# Patient Record
Sex: FEMALE | Race: WHITE | NOT HISPANIC OR LATINO | Employment: OTHER | URBAN - METROPOLITAN AREA
[De-identification: names, ages, dates, MRNs, and addresses within clinical notes are randomized per-mention and may not be internally consistent; named-entity substitution may affect disease eponyms.]

---

## 2017-06-22 ENCOUNTER — PATIENT OUTREACH (OUTPATIENT)
Dept: ADMINISTRATIVE | Facility: HOSPITAL | Age: 72
End: 2017-06-22

## 2017-06-22 NOTE — PROGRESS NOTES
"CALLED PATIENT TO SCHEDULE APPT. HASN'T BEEN IN SINCE 6/30/2015. PATIENT STATED "I SEE A DIFFERENT PCP NOW". UPDATED CARE TEAM.  "

## 2018-07-23 ENCOUNTER — OFFICE VISIT (OUTPATIENT)
Dept: OPHTHALMOLOGY | Facility: CLINIC | Age: 73
End: 2018-07-23
Payer: MEDICARE

## 2018-07-23 DIAGNOSIS — H52.03 HYPEROPIA OF BOTH EYES WITH ASTIGMATISM AND PRESBYOPIA: ICD-10-CM

## 2018-07-23 DIAGNOSIS — H25.013 CORTICAL AGE-RELATED CATARACT OF BOTH EYES: ICD-10-CM

## 2018-07-23 DIAGNOSIS — H52.4 HYPEROPIA OF BOTH EYES WITH ASTIGMATISM AND PRESBYOPIA: ICD-10-CM

## 2018-07-23 DIAGNOSIS — H52.203 HYPEROPIA OF BOTH EYES WITH ASTIGMATISM AND PRESBYOPIA: ICD-10-CM

## 2018-07-23 DIAGNOSIS — H25.13 NUCLEAR SCLEROSIS, BILATERAL: Primary | ICD-10-CM

## 2018-07-23 PROCEDURE — 92004 COMPRE OPH EXAM NEW PT 1/>: CPT | Mod: S$PBB,,, | Performed by: OPTOMETRIST

## 2018-07-23 PROCEDURE — 92015 DETERMINE REFRACTIVE STATE: CPT | Mod: ,,, | Performed by: OPTOMETRIST

## 2018-07-23 PROCEDURE — 99211 OFF/OP EST MAY X REQ PHY/QHP: CPT | Mod: PBBFAC,PO | Performed by: OPTOMETRIST

## 2018-07-23 PROCEDURE — 99999 PR PBB SHADOW E&M-EST. PATIENT-LVL I: CPT | Mod: PBBFAC,,, | Performed by: OPTOMETRIST

## 2018-07-23 RX ORDER — DIPYRIDAMOLE 50 MG
50 TABLET ORAL
COMMUNITY
Start: 2018-07-06 | End: 2018-07-23 | Stop reason: SDUPTHER

## 2018-07-23 RX ORDER — AMIODARONE HYDROCHLORIDE 200 MG/1
TABLET ORAL
COMMUNITY
Start: 2018-07-10 | End: 2018-07-23 | Stop reason: SDUPTHER

## 2018-07-23 NOTE — PROGRESS NOTES
HPI     PTs last exam was 11/5/14 with DNL. PT c/o overall blurred va and wears   gls full time.   HPI    Any vision changes since last exam: decreased overall va  Eye pain: no  Other ocular symptoms: dryness, uses AT with some relief    Do you wear currently wear glasses or contacts? gls    Interested in contacts today? no    Do you plan on getting new glasses today? If needed      Last edited by Ely Mcmahon MA on 7/23/2018  2:32 PM. (History)            Assessment /Plan     For exam results, see Encounter Report.    Nuclear sclerosis, bilateral  Cortical age-related cataract of both eyes  Surgery is not indicated at this time. Monitor 12 months.    Hyperopia of both eyes with astigmatism and presbyopia  Eyeglass Final Rx     Eyeglass Final Rx       Sphere Cylinder Axis Add    Right +1.00 +0.50 150 +3.00    Left +1.50 +0.50 180 +3.00    Type:  PAL    Expiration Date:  7/24/2019          Eyeglass Final Rx #2       Sphere Cylinder Axis Add    Right +4.00 +0.50 150     Left +4.50 +0.50 180     Type:  SVL    Expiration Date:  7/24/2019    Comments:  Reading only                Counseled on shorter working distance  RTC 1 yr for dilated eye exam or PRN if any problems.   Discussed above and answered questions.

## 2021-03-11 ENCOUNTER — OFFICE VISIT (OUTPATIENT)
Dept: OPHTHALMOLOGY | Facility: CLINIC | Age: 76
End: 2021-03-11
Payer: MEDICARE

## 2021-03-11 DIAGNOSIS — H52.03 HYPEROPIA WITH ASTIGMATISM AND PRESBYOPIA, BILATERAL: ICD-10-CM

## 2021-03-11 DIAGNOSIS — H52.4 HYPEROPIA WITH ASTIGMATISM AND PRESBYOPIA, BILATERAL: ICD-10-CM

## 2021-03-11 DIAGNOSIS — H25.013 CORTICAL AGE-RELATED CATARACT OF BOTH EYES: ICD-10-CM

## 2021-03-11 DIAGNOSIS — H52.203 HYPEROPIA WITH ASTIGMATISM AND PRESBYOPIA, BILATERAL: ICD-10-CM

## 2021-03-11 DIAGNOSIS — H25.13 NUCLEAR SCLEROSIS, BILATERAL: Primary | ICD-10-CM

## 2021-03-11 PROCEDURE — 92014 PR EYE EXAM, EST PATIENT,COMPREHESV: ICD-10-PCS | Mod: S$PBB,,, | Performed by: OPTOMETRIST

## 2021-03-11 PROCEDURE — 99213 OFFICE O/P EST LOW 20 MIN: CPT | Mod: PBBFAC | Performed by: OPTOMETRIST

## 2021-03-11 PROCEDURE — 92014 COMPRE OPH EXAM EST PT 1/>: CPT | Mod: S$PBB,,, | Performed by: OPTOMETRIST

## 2021-03-11 PROCEDURE — 99999 PR PBB SHADOW E&M-EST. PATIENT-LVL III: CPT | Mod: PBBFAC,,, | Performed by: OPTOMETRIST

## 2021-03-11 PROCEDURE — 92015 DETERMINE REFRACTIVE STATE: CPT | Mod: ,,, | Performed by: OPTOMETRIST

## 2021-03-11 PROCEDURE — 92015 PR REFRACTION: ICD-10-PCS | Mod: ,,, | Performed by: OPTOMETRIST

## 2021-03-11 PROCEDURE — 99999 PR PBB SHADOW E&M-EST. PATIENT-LVL III: ICD-10-PCS | Mod: PBBFAC,,, | Performed by: OPTOMETRIST

## 2021-10-26 ENCOUNTER — OFFICE VISIT (OUTPATIENT)
Dept: OPHTHALMOLOGY | Facility: CLINIC | Age: 76
End: 2021-10-26
Payer: MEDICARE

## 2021-10-26 DIAGNOSIS — H01.02A SQUAMOUS BLEPHARITIS OF UPPER AND LOWER EYELIDS OF BOTH EYES: ICD-10-CM

## 2021-10-26 DIAGNOSIS — H02.20B: Primary | ICD-10-CM

## 2021-10-26 DIAGNOSIS — H25.13 NUCLEAR SCLEROSIS, BILATERAL: ICD-10-CM

## 2021-10-26 DIAGNOSIS — H16.212 EXPOSURE KERATOCONJUNCTIVITIS OF EYE, LEFT: ICD-10-CM

## 2021-10-26 DIAGNOSIS — H25.013 CORTICAL AGE-RELATED CATARACT OF BOTH EYES: ICD-10-CM

## 2021-10-26 DIAGNOSIS — H01.02B SQUAMOUS BLEPHARITIS OF UPPER AND LOWER EYELIDS OF BOTH EYES: ICD-10-CM

## 2021-10-26 PROCEDURE — 92012 INTRM OPH EXAM EST PATIENT: CPT | Mod: S$PBB,,, | Performed by: OPTOMETRIST

## 2021-10-26 PROCEDURE — 99213 OFFICE O/P EST LOW 20 MIN: CPT | Mod: PBBFAC | Performed by: OPTOMETRIST

## 2021-10-26 PROCEDURE — 99999 PR PBB SHADOW E&M-EST. PATIENT-LVL III: ICD-10-PCS | Mod: PBBFAC,,, | Performed by: OPTOMETRIST

## 2021-10-26 PROCEDURE — 92012 PR EYE EXAM, EST PATIENT,INTERMED: ICD-10-PCS | Mod: S$PBB,,, | Performed by: OPTOMETRIST

## 2021-10-26 PROCEDURE — 99999 PR PBB SHADOW E&M-EST. PATIENT-LVL III: CPT | Mod: PBBFAC,,, | Performed by: OPTOMETRIST

## 2021-10-26 RX ORDER — TOBRAMYCIN AND DEXAMETHASONE 3; 1 MG/ML; MG/ML
1 SUSPENSION/ DROPS OPHTHALMIC 4 TIMES DAILY
Qty: 2.5 ML | Refills: 1 | Status: SHIPPED | OUTPATIENT
Start: 2021-10-26 | End: 2021-11-02

## 2021-11-22 ENCOUNTER — OFFICE VISIT (OUTPATIENT)
Dept: OPHTHALMOLOGY | Facility: CLINIC | Age: 76
End: 2021-11-22
Payer: MEDICARE

## 2021-11-22 DIAGNOSIS — H01.02B SQUAMOUS BLEPHARITIS OF UPPER AND LOWER EYELIDS OF BOTH EYES: ICD-10-CM

## 2021-11-22 DIAGNOSIS — H25.13 NUCLEAR SCLEROSIS, BILATERAL: ICD-10-CM

## 2021-11-22 DIAGNOSIS — H25.013 CORTICAL AGE-RELATED CATARACT OF BOTH EYES: Primary | ICD-10-CM

## 2021-11-22 DIAGNOSIS — H01.02A SQUAMOUS BLEPHARITIS OF UPPER AND LOWER EYELIDS OF BOTH EYES: ICD-10-CM

## 2021-11-22 PROCEDURE — 92004 COMPRE OPH EXAM NEW PT 1/>: CPT | Mod: S$PBB,,, | Performed by: OPHTHALMOLOGY

## 2021-11-22 PROCEDURE — 92004 PR EYE EXAM, NEW PATIENT,COMPREHESV: ICD-10-PCS | Mod: S$PBB,,, | Performed by: OPHTHALMOLOGY

## 2021-11-22 PROCEDURE — 99999 PR PBB SHADOW E&M-EST. PATIENT-LVL II: ICD-10-PCS | Mod: PBBFAC,,, | Performed by: OPHTHALMOLOGY

## 2021-11-22 PROCEDURE — 99212 OFFICE O/P EST SF 10 MIN: CPT | Mod: PBBFAC | Performed by: OPHTHALMOLOGY

## 2021-11-22 PROCEDURE — 99999 PR PBB SHADOW E&M-EST. PATIENT-LVL II: CPT | Mod: PBBFAC,,, | Performed by: OPHTHALMOLOGY

## 2021-11-22 RX ORDER — KETOROLAC TROMETHAMINE 5 MG/ML
1 SOLUTION OPHTHALMIC 4 TIMES DAILY
Qty: 5 ML | Refills: 1 | Status: SHIPPED | OUTPATIENT
Start: 2021-11-22

## 2021-11-22 RX ORDER — PREDNISOLONE ACETATE 10 MG/ML
1 SUSPENSION/ DROPS OPHTHALMIC 4 TIMES DAILY
Qty: 5 ML | Refills: 1 | Status: SHIPPED | OUTPATIENT
Start: 2021-11-22

## 2021-11-22 RX ORDER — GATIFLOXACIN 5 MG/ML
1 SOLUTION/ DROPS OPHTHALMIC 2 TIMES DAILY
Qty: 5 ML | Refills: 2 | Status: SHIPPED | OUTPATIENT
Start: 2021-11-22

## 2022-01-06 ENCOUNTER — TELEPHONE (OUTPATIENT)
Dept: OPHTHALMOLOGY | Facility: CLINIC | Age: 77
End: 2022-01-06
Payer: MEDICARE

## 2022-01-06 NOTE — TELEPHONE ENCOUNTER
Spoke with patient who has other health issues. She will call when she is ready to reschedule procedure canceled for 1/12

## 2022-01-27 ENCOUNTER — OFFICE VISIT (OUTPATIENT)
Dept: URGENT CARE | Facility: CLINIC | Age: 77
End: 2022-01-27
Payer: MEDICARE

## 2022-01-27 VITALS
SYSTOLIC BLOOD PRESSURE: 130 MMHG | WEIGHT: 200 LBS | RESPIRATION RATE: 18 BRPM | HEIGHT: 64 IN | BODY MASS INDEX: 34.15 KG/M2 | HEART RATE: 65 BPM | OXYGEN SATURATION: 96 % | DIASTOLIC BLOOD PRESSURE: 73 MMHG | TEMPERATURE: 99 F

## 2022-01-27 DIAGNOSIS — R05.9 COUGH: ICD-10-CM

## 2022-01-27 DIAGNOSIS — R35.0 URINE FREQUENCY: Primary | ICD-10-CM

## 2022-01-27 DIAGNOSIS — I10 HYPERTENSION, UNSPECIFIED TYPE: ICD-10-CM

## 2022-01-27 DIAGNOSIS — R30.0 DYSURIA: ICD-10-CM

## 2022-01-27 DIAGNOSIS — R11.0 NAUSEA: ICD-10-CM

## 2022-01-27 DIAGNOSIS — R31.9 URINARY TRACT INFECTION WITH HEMATURIA, SITE UNSPECIFIED: ICD-10-CM

## 2022-01-27 DIAGNOSIS — N39.0 URINARY TRACT INFECTION WITH HEMATURIA, SITE UNSPECIFIED: ICD-10-CM

## 2022-01-27 LAB
BILIRUB UR QL STRIP: POSITIVE
CTP QC/QA: YES
GLUCOSE UR QL STRIP: NEGATIVE
KETONES UR QL STRIP: NEGATIVE
LEUKOCYTE ESTERASE UR QL STRIP: POSITIVE
PH, POC UA: 5.5
POC BLOOD, URINE: POSITIVE
POC NITRATES, URINE: NEGATIVE
PROT UR QL STRIP: POSITIVE
SARS-COV-2 RDRP RESP QL NAA+PROBE: NEGATIVE
SP GR UR STRIP: 1.02 (ref 1–1.03)
UROBILINOGEN UR STRIP-ACNC: 1 (ref 0.1–1.1)

## 2022-01-27 PROCEDURE — 87086 URINE CULTURE/COLONY COUNT: CPT | Performed by: NURSE PRACTITIONER

## 2022-01-27 PROCEDURE — 87186 SC STD MICRODIL/AGAR DIL: CPT | Performed by: NURSE PRACTITIONER

## 2022-01-27 PROCEDURE — 81003 POCT URINALYSIS, DIPSTICK, AUTOMATED, W/O SCOPE: ICD-10-PCS | Mod: QW,S$GLB,, | Performed by: NURSE PRACTITIONER

## 2022-01-27 PROCEDURE — 99204 OFFICE O/P NEW MOD 45 MIN: CPT | Mod: S$GLB,,, | Performed by: NURSE PRACTITIONER

## 2022-01-27 PROCEDURE — 87077 CULTURE AEROBIC IDENTIFY: CPT | Performed by: NURSE PRACTITIONER

## 2022-01-27 PROCEDURE — 81003 URINALYSIS AUTO W/O SCOPE: CPT | Mod: QW,S$GLB,, | Performed by: NURSE PRACTITIONER

## 2022-01-27 PROCEDURE — U0002 COVID-19 LAB TEST NON-CDC: HCPCS | Mod: QW,CR,S$GLB, | Performed by: NURSE PRACTITIONER

## 2022-01-27 PROCEDURE — U0002: ICD-10-PCS | Mod: QW,CR,S$GLB, | Performed by: NURSE PRACTITIONER

## 2022-01-27 PROCEDURE — 99204 PR OFFICE/OUTPT VISIT, NEW, LEVL IV, 45-59 MIN: ICD-10-PCS | Mod: S$GLB,,, | Performed by: NURSE PRACTITIONER

## 2022-01-27 PROCEDURE — 87088 URINE BACTERIA CULTURE: CPT | Performed by: NURSE PRACTITIONER

## 2022-01-27 RX ORDER — ONDANSETRON 4 MG/1
4 TABLET, FILM COATED ORAL EVERY 6 HOURS PRN
Qty: 10 TABLET | Refills: 0 | Status: SHIPPED | OUTPATIENT
Start: 2022-01-27

## 2022-01-27 RX ORDER — PHENAZOPYRIDINE HYDROCHLORIDE 200 MG/1
200 TABLET, FILM COATED ORAL
Qty: 6 TABLET | Refills: 0 | Status: SHIPPED | OUTPATIENT
Start: 2022-01-27 | End: 2022-01-29

## 2022-01-27 RX ORDER — FUROSEMIDE 20 MG/1
20 TABLET ORAL DAILY
COMMUNITY
Start: 2022-01-18

## 2022-01-27 RX ORDER — HYDROCODONE BITARTRATE AND ACETAMINOPHEN 5; 325 MG/1; MG/1
1 TABLET ORAL EVERY 4 HOURS PRN
COMMUNITY
Start: 2022-01-12

## 2022-01-27 RX ORDER — DOXYCYCLINE 100 MG/1
100 CAPSULE ORAL EVERY 12 HOURS
Qty: 20 CAPSULE | Refills: 0 | Status: SHIPPED | OUTPATIENT
Start: 2022-01-27 | End: 2022-02-06

## 2022-01-27 NOTE — PROGRESS NOTES
"Subjective:       Patient ID: Jessica Vera is a 77 y.o. female.    Vitals:  height is 5' 3.5" (1.613 m) and weight is 90.7 kg (200 lb). Her tympanic temperature is 99 °F (37.2 °C). Her blood pressure is 130/73 and her pulse is 65. Her respiration is 18 and oxygen saturation is 96%.     Chief Complaint: No chief complaint on file.    Patient present with UTI and Covid concerns. Patient complains congestion,low grade fever, and sore throat since Tuesday. Taken OTC med (tylenol) taken tylenol around 8 am. No known exposure to covid or flu.     Patient complains Dysuria and frequent urination since Tuesday afternoon. Taken OTC med (tylenol)     Other  This is a new problem. The current episode started in the past 7 days. The problem occurs constantly. The problem has been gradually worsening. Associated symptoms include congestion, coughing, a fever, headaches, a sore throat and urinary symptoms. She has tried acetaminophen for the symptoms. The treatment provided no relief.       Constitution: Positive for fever.   HENT: Positive for congestion and sore throat.    Neck: neck negative.   Cardiovascular: Negative.    Eyes: Negative.    Respiratory: Positive for cough.    Gastrointestinal: Negative.    Endocrine: negative.   Genitourinary: Positive for dysuria and frequency.   Musculoskeletal: Negative.    Skin: Negative.    Allergic/Immunologic: Negative.    Neurological: Positive for headaches.   Hematologic/Lymphatic: Negative.    Psychiatric/Behavioral: Negative.           Past Medical History:   Diagnosis Date    Allergy     Asthma, chronic obstructive, with acute exacerbation     CAD (coronary artery disease)     Diverticulosis     Dyslipidemia     GERD (gastroesophageal reflux disease)     History of frequent urinary tract infections     Hypertension     Thrombophilia          .  Past Surgical History:   Procedure Laterality Date    BREAST LUMPECTOMY  01/12/2022    BREAST SURGERY      CARDIAC " DEFIBRILLATOR PLACEMENT      cardiac stenting x's 2      12 x's.     CHOLECYSTECTOMY      HYSTERECTOMY           Social History     Socioeconomic History    Marital status:      Spouse name: Al    Number of children: 2   Tobacco Use    Smoking status: Never Smoker    Smokeless tobacco: Never Used   Substance and Sexual Activity    Alcohol use: Yes     Comment: twice a year    Drug use: No       Family History   Problem Relation Age of Onset    Diabetes Mother     Cancer Mother         breast    Cataracts Mother     Heart disease Father     Heart disease Brother     Diabetes Maternal Grandmother     Heart disease Paternal Grandmother     Heart disease Paternal Grandfather          ROS:  GENERAL: fever, chills with body aches  SKIN: No rashes, itching or changes in color or texture of skin.   HEENT:  Reports runny nose, nasal congestion, headache  NODES: No masses or lesions. Denies swollen glands.   CHEST: reports cough   CARDIOVASCULAR: Denies chest pain, shortness of breath.  ABDOMEN:  Reports nausea, abdominal pressure  :  Dysuria urinary frequency  MUSCULOSKELETAL: No joint stiffness or swelling. Denies back pain.  NEUROLOGIC: No history of seizures, paralysis, alteration of gait or coordination.  PSYCHIATRIC: Denies mood swings, depression or suicidal thoughts.    PE:   APPEARANCE: Well nourished, well developed, in mild distress.   V/S: Reviewed.  SKIN: Normal skin turgor, no lesions.  HEENT: Turbinates injected, minimal red pharynx. TM's poor light reflex bilateral, no facial tenderness.  CHEST: Lungs clear to auscultation. No wheezing  CARDIOVASCULAR: Regular rate and rhythm.  ABDOMEN: Soft. No tenderness or masses.  NEUROLOGIC: No sensory deficits. Gait & Posture: Normal, No cerebellar signs.  MENTAL STATUS: Patient alert, oriented x 3 & conversant.    PLAN: Lab work POCT rapid Covid 19 screen/negative, POCT UA, C&S  Advise increase p.o. fluids--water/juice & rest  Meds:   Doxycycline, pyridium and Zofran  / no refills  Simply saline nasal wash to irrigate sinuses and for congestion/runny nose.  Cool mist humidifier/vaporizer.  Practice good handwashing.  Advise use of green tea with honey  Tylenol or Ibuprofen for fever, headache and body aches.  Warm salt water gargles for throat comfort.  Chloraseptic spray or lozenges for throat comfort.  Advise follow up with PCP in 2-3 days for recheck  Advise go to ER if symptoms worsen or fail to improve with treatment.  Instructions, follow up, and supportive care as per AVS.  AVS provided and reviewed with patient including supportive care, follow up, and red flag symptoms.   Patient verbalizes understanding and agrees with treatment plan. Discharged from Urgent Care in stable condition.  You have tested negative for COVID-19 today. If you did not have any close exposure as defined below, then effective today, you can return to your normal daily activities including social distancing, wearing masks, and frequent handwashing.    DIAGNOSIS:  Nausea  Dysuria  Hypertension  Urinary frequency  Suspect COVID-19  UTI with hematuria

## 2022-01-27 NOTE — PATIENT INSTRUCTIONS
PLAN: Lab work POCT rapid Covid 19 screen/negative, POCT UA, C&S  Advise increase p.o. fluids--water/juice & rest  Meds:  Doxycycline, pyridium and Zofran  / no refills  Simply saline nasal wash to irrigate sinuses and for congestion/runny nose.  Cool mist humidifier/vaporizer.  Practice good handwashing.  Advise use of green tea with honey  Tylenol or Ibuprofen for fever, headache and body aches.  Warm salt water gargles for throat comfort.  Chloraseptic spray or lozenges for throat comfort.  Advise follow up with PCP in 2-3 days for recheck  Advise go to ER if symptoms worsen or fail to improve with treatment.  Instructions, follow up, and supportive care as per AVS.  AVS provided and reviewed with patient including supportive care, follow up, and red flag symptoms.   Patient verbalizes understanding and agrees with treatment plan. Discharged from Urgent Care in stable condition.  You have tested negative for COVID-19 today. If you did not have any close exposure as defined below, then effective today, you can return to your normal daily activities including social distancing, wearing masks, and frequent handwashing.

## 2022-01-30 ENCOUNTER — TELEPHONE (OUTPATIENT)
Dept: URGENT CARE | Facility: CLINIC | Age: 77
End: 2022-01-30
Payer: MEDICARE

## 2022-01-30 LAB — BACTERIA UR CULT: ABNORMAL

## 2022-01-31 ENCOUNTER — TELEPHONE (OUTPATIENT)
Dept: URGENT CARE | Facility: CLINIC | Age: 77
End: 2022-01-31
Payer: MEDICARE

## 2022-01-31 DIAGNOSIS — R35.0 URINE FREQUENCY: Primary | ICD-10-CM

## 2022-01-31 DIAGNOSIS — N39.0 URINARY TRACT INFECTION WITH HEMATURIA, SITE UNSPECIFIED: ICD-10-CM

## 2022-01-31 DIAGNOSIS — R31.9 URINARY TRACT INFECTION WITH HEMATURIA, SITE UNSPECIFIED: ICD-10-CM

## 2022-01-31 RX ORDER — GRANULES FOR ORAL 3 G/1
3 POWDER ORAL ONCE
Qty: 3 G | Refills: 0 | Status: SHIPPED | OUTPATIENT
Start: 2022-01-31 | End: 2022-01-31

## 2022-01-31 NOTE — TELEPHONE ENCOUNTER
Called patient to discuss her urine culture results. She is not feeling any better and been taking doxycycline. She has multiple drug allergy. The only sensitive antibiotic option we have it levoquin. I will order 3 days or levoquin. She was instructed to stop current antibiotic.

## 2022-01-31 NOTE — TELEPHONE ENCOUNTER
Called patient back to let her know that I am not going to but her on levaquin due to QT prolongation. I will call her in fosfomycin it is a one time dose powder.

## 2022-05-15 ENCOUNTER — OFFICE VISIT (OUTPATIENT)
Dept: URGENT CARE | Facility: CLINIC | Age: 77
End: 2022-05-15
Payer: MEDICARE

## 2022-05-15 VITALS
RESPIRATION RATE: 16 BRPM | HEART RATE: 81 BPM | BODY MASS INDEX: 34.21 KG/M2 | SYSTOLIC BLOOD PRESSURE: 138 MMHG | HEIGHT: 64 IN | DIASTOLIC BLOOD PRESSURE: 68 MMHG | TEMPERATURE: 97 F | OXYGEN SATURATION: 96 % | WEIGHT: 200.38 LBS

## 2022-05-15 DIAGNOSIS — L30.9 DERMATITIS: ICD-10-CM

## 2022-05-15 DIAGNOSIS — I10 HYPERTENSION, UNSPECIFIED TYPE: ICD-10-CM

## 2022-05-15 DIAGNOSIS — R21 RASH: ICD-10-CM

## 2022-05-15 DIAGNOSIS — L29.9 PRURITUS: Primary | ICD-10-CM

## 2022-05-15 PROCEDURE — 99214 PR OFFICE/OUTPT VISIT, EST, LEVL IV, 30-39 MIN: ICD-10-PCS | Mod: S$GLB,,, | Performed by: NURSE PRACTITIONER

## 2022-05-15 PROCEDURE — 99214 OFFICE O/P EST MOD 30 MIN: CPT | Mod: S$GLB,,, | Performed by: NURSE PRACTITIONER

## 2022-05-15 RX ORDER — HYDROXYZINE HYDROCHLORIDE 25 MG/1
25 TABLET, FILM COATED ORAL NIGHTLY
Qty: 10 TABLET | Refills: 0 | Status: SHIPPED | OUTPATIENT
Start: 2022-05-15 | End: 2022-05-25

## 2022-05-15 NOTE — PATIENT INSTRUCTIONS
PLAN:   Consult Dermatology  Advise increase p.o. fluids--at least 64 ounces of water/juice & rest  Meds: Atarax / no refills  Advise avoid hot baths  Advise unscented soaps  Practice good handwashing.  Advise use ALL Clear  Advise use Eucerin ani-itch lotion  Advise follow up with PCP in 2- 3 days for recheck  Advise go to ER if nausea, vomiting, fever, increased pain, or fail to improve with treatment.  AVS provided and reviewed with patient including supportive care, follow up, and red flag symptoms.   Patient verbalizes understanding and agrees with treatment plan. Discharged from Urgent Care in stable condition.

## 2022-05-15 NOTE — PROGRESS NOTES
"Subjective:       Patient ID: Jessica Vera is a 77 y.o. female.    Vitals:  height is 5' 3.5" (1.613 m) and weight is 90.9 kg (200 lb 6.4 oz). Her tympanic temperature is 96.9 °F (36.1 °C). Her blood pressure is 138/68 and her pulse is 81. Her respiration is 16 and oxygen saturation is 96%.     Chief Complaint: Rash    Patient presents with rash that started on Tuesday. Patient states rash is on palms, forearms, legs.  She notes that they are flat and itchy.    Rash  This is a new problem. The current episode started in the past 7 days (5). The problem is unchanged. The affected locations include the right hand, left hand, right arm, left arm, left upper leg and right upper leg. The rash is characterized by redness and itchiness. She was exposed to nothing. Pertinent negatives include no anorexia, congestion, cough, diarrhea, eye pain, facial edema, fatigue, fever, joint pain, nail changes, rhinorrhea, shortness of breath, sore throat or vomiting. Past treatments include antihistamine. The treatment provided mild relief.       Constitution: Negative for fatigue and fever.   HENT: Negative for congestion and sore throat.    Eyes: Negative for eye pain.   Respiratory: Negative for cough and shortness of breath.    Gastrointestinal: Negative for vomiting and diarrhea.   Skin: Positive for rash.         Past Medical History:   Diagnosis Date    Allergy     Asthma, chronic obstructive, with acute exacerbation     CAD (coronary artery disease)     Diverticulosis     Dyslipidemia     GERD (gastroesophageal reflux disease)     History of frequent urinary tract infections     Hypertension     Thrombophilia           Social History     Socioeconomic History    Marital status:      Spouse name: Al    Number of children: 2   Tobacco Use    Smoking status: Never Smoker    Smokeless tobacco: Never Used   Substance and Sexual Activity    Alcohol use: Yes     Comment: twice a year    Drug use: No        "   Family History   Problem Relation Age of Onset    Diabetes Mother     Cancer Mother         breast    Cataracts Mother     Heart disease Father     Heart disease Brother     Diabetes Maternal Grandmother     Heart disease Paternal Grandmother     Heart disease Paternal Grandfather        ROS:  GENERAL: No fever, chills, fatigability or weight loss.  SKIN:  rash, itching increases on increased temperature  HEENT: No headaches or recent head trauma. Visual acuity fine. No photophobia, ocular pain or diplopia. Denies ear pain, discharge or vertigo. No loss of smell, no epistaxis or postnasal drip. No hoarseness or change in voice.   NODES: No masses or lesions. Denies swollen glands.  CHEST: Denies cyanosis, wheezing, cough and sputum production.  CARDIOVASCULAR: Denies chest pain, PND, orthopnea or reduced exercise tolerance.  ABDOMEN: Appetite fine. No weight loss. Denies diarrhea, abdominal pain, hematemesis or blood in stool.  MUSCULOSKELETAL: No joint stiffness or swelling. Denies back pain.  NEUROLOGIC: No history of seizures, paralysis, alteration of gait or coordination.  PSYCHIATRIC: Denies mood swings, depression or suicidal thoughts.    PE:   APPEARANCE: Well nourished, well developed, in mild distress.  Temp 96.9°, pulse ox 96%  V/S: Reviewed.  SKIN:  Bilateral arms, hands, legs, back with multiple red macular skin lesions minimal excoriations, no tenderness, no soft tissue swelling        HEENT:  turbinates pink, no oral soft tissue swelling, pink pharynx, TM's clear bilateral.  No facial soft tissue swelling  CHEST: Lungs clear to auscultation.  No wheezing  CARDIOVASCULAR: Regular rate and rhythm   MUSCULOSKELETAL: Motor: 5/5 strength major flexors/extensors.  NEUROLOGIC: No sensory deficits. Gait & Posture: Normal gait and fine motion. No cerebellar signs.  MENTAL STATUS: Patient alert, oriented x 3 & conversant.    PLAN:   Consult Dermatology  Advise increase p.o. fluids--at least 64 ounces  of water/juice & rest  Meds: Atarax / no refills  Advise avoid hot baths  Advise unscented soaps  Practice good handwashing.  Advise use ALL Clear  Advise use Eucerin ani-itch lotion  Advise follow up with PCP in 2- 3 days for recheck  Advise go to ER if nausea, vomiting, fever, increased pain, or fail to improve with treatment.  AVS provided and reviewed with patient including supportive care, follow up, and red flag symptoms.   Patient verbalizes understanding and agrees with treatment plan. Discharged from Urgent Care in stable condition.      DIAGNOSIS:   Rash  Pruritus  Dermatitis  Hypertension

## 2022-05-18 ENCOUNTER — TELEPHONE (OUTPATIENT)
Dept: URGENT CARE | Facility: CLINIC | Age: 77
End: 2022-05-18
Payer: MEDICARE

## 2022-05-18 NOTE — TELEPHONE ENCOUNTER
Made a courtesy call to patient, she states the rash has cleared up since her recent visit at the clinic.

## 2022-09-14 ENCOUNTER — OFFICE VISIT (OUTPATIENT)
Dept: URGENT CARE | Facility: CLINIC | Age: 77
End: 2022-09-14
Payer: MEDICARE

## 2022-09-14 VITALS
WEIGHT: 200 LBS | OXYGEN SATURATION: 96 % | SYSTOLIC BLOOD PRESSURE: 131 MMHG | TEMPERATURE: 99 F | RESPIRATION RATE: 18 BRPM | BODY MASS INDEX: 35.44 KG/M2 | HEIGHT: 63 IN | HEART RATE: 72 BPM | DIASTOLIC BLOOD PRESSURE: 66 MMHG

## 2022-09-14 DIAGNOSIS — N30.01 ACUTE CYSTITIS WITH HEMATURIA: Primary | ICD-10-CM

## 2022-09-14 DIAGNOSIS — R09.81 CHRONIC NASAL CONGESTION: ICD-10-CM

## 2022-09-14 DIAGNOSIS — R30.0 DYSURIA: ICD-10-CM

## 2022-09-14 LAB
BILIRUB UR QL STRIP: POSITIVE
GLUCOSE UR QL STRIP: NEGATIVE
KETONES UR QL STRIP: NEGATIVE
LEUKOCYTE ESTERASE UR QL STRIP: POSITIVE
PH, POC UA: 5
POC BLOOD, URINE: POSITIVE
POC NITRATES, URINE: NEGATIVE
PROT UR QL STRIP: POSITIVE
SP GR UR STRIP: 1.02 (ref 1–1.03)
UROBILINOGEN UR STRIP-ACNC: NORMAL (ref 0.1–1.1)

## 2022-09-14 PROCEDURE — 87077 CULTURE AEROBIC IDENTIFY: CPT

## 2022-09-14 PROCEDURE — 87086 URINE CULTURE/COLONY COUNT: CPT

## 2022-09-14 PROCEDURE — 99213 PR OFFICE/OUTPT VISIT, EST, LEVL III, 20-29 MIN: ICD-10-PCS | Mod: S$GLB,,,

## 2022-09-14 PROCEDURE — 87088 URINE BACTERIA CULTURE: CPT

## 2022-09-14 PROCEDURE — 81003 POCT URINALYSIS, DIPSTICK, AUTOMATED, W/O SCOPE: ICD-10-PCS | Mod: QW,S$GLB,,

## 2022-09-14 PROCEDURE — 99213 OFFICE O/P EST LOW 20 MIN: CPT | Mod: S$GLB,,,

## 2022-09-14 PROCEDURE — 87186 SC STD MICRODIL/AGAR DIL: CPT

## 2022-09-14 PROCEDURE — 81003 URINALYSIS AUTO W/O SCOPE: CPT | Mod: QW,S$GLB,,

## 2022-09-14 RX ORDER — GRANULES FOR ORAL 3 G/1
3 POWDER ORAL ONCE
Qty: 3 G | Refills: 0 | Status: SHIPPED | OUTPATIENT
Start: 2022-09-14 | End: 2022-09-14

## 2022-09-14 NOTE — PROGRESS NOTES
"Subjective:       Patient ID: Jessica Vera is a 77 y.o. female.    Vitals:  height is 5' 3" (1.6 m) and weight is 90.7 kg (200 lb). Her tympanic temperature is 98.9 °F (37.2 °C). Her blood pressure is 131/66 and her pulse is 72. Her respiration is 18 and oxygen saturation is 96%.     Chief Complaint: Dysuria    Pt presents today with UTI Concerns and Sinus Problems. Pt states that for appx 4 days she has been experiencing lower abdominal pain, burning when urination,and pressure. Denies fever, flank pain, nausea/vomiting  Pt also states that she is experiencing Sinus Problems. This has been ongoing for 6 months. Her symptoms are runny nose and sneezing. She has used flonase inconsistently and mucinex.   Pt states that she has taken OTC medication for both of her concerns.    Dysuria   This is a new problem. The current episode started in the past 7 days. The problem has been gradually worsening. The quality of the pain is described as stabbing and burning. The pain is at a severity of 8/10. The pain is severe. There has been no fever. She is Not sexually active. Associated symptoms include frequency, hesitancy, nausea and urgency. Pertinent negatives include no behavior changes, chills, discharge, flank pain, hematuria, possible pregnancy, sweats, vomiting, weight loss, bubble bath use, constipation, rash or withholding. The treatment provided no relief.   Sinus Problem  This is a new problem. The current episode started more than 1 month ago. The problem is unchanged. There has been no fever. Her pain is at a severity of 0/10. Associated symptoms include sinus pressure and sneezing. Pertinent negatives include no chills, congestion, coughing, diaphoresis, ear pain, headaches, hoarse voice, neck pain, shortness of breath, sore throat or swollen glands. Past treatments include oral decongestants. The treatment provided no relief.     Constitution: Negative for chills and sweating.   HENT:  Positive for sinus " pressure. Negative for ear pain, congestion and sore throat.    Neck: Negative for neck pain.   Respiratory:  Negative for cough and shortness of breath.    Gastrointestinal:  Positive for nausea. Negative for vomiting and constipation.   Genitourinary:  Positive for dysuria, frequency and urgency. Negative for flank pain and hematuria.   Skin:  Negative for rash.   Allergic/Immunologic: Positive for sneezing.   Neurological:  Negative for headaches.     Objective:      Physical Exam   Constitutional: She is oriented to person, place, and time. She appears well-developed.   HENT:   Head: Normocephalic and atraumatic.   Ears:   Right Ear: External ear normal.   Left Ear: External ear normal.   Nose: Rhinorrhea present. No nasal deformity. No epistaxis.      Comments: No sinus tenderness to palpation  Mouth/Throat: Oropharynx is clear and moist and mucous membranes are normal.   Eyes: Lids are normal.   Neck: Trachea normal and phonation normal. Neck supple.   Cardiovascular: Normal pulses.   Pulmonary/Chest: Effort normal.   Abdominal: Normal appearance and bowel sounds are normal. She exhibits no distension. Soft. There is no abdominal tenderness.      Comments: No CVAT   Neurological: She is alert and oriented to person, place, and time.   Skin: Skin is warm, dry and intact.   Psychiatric: Her speech is normal and behavior is normal.   Nursing note and vitals reviewed.      Results for orders placed or performed in visit on 09/14/22   POCT Urinalysis, Dipstick, Automated, W/O Scope   Result Value Ref Range    POC Blood, Urine Positive (A) Negative    POC Bilirubin, Urine Positive (A) Negative    POC Urobilinogen, Urine normal 0.1 - 1.1    POC Ketones, Urine Negative Negative    POC Protein, Urine Positive (A) Negative    POC Nitrates, Urine Negative Negative    POC Glucose, Urine Negative Negative    pH, UA 5.0     POC Specific Gravity, Urine 1.025 1.003 - 1.029    POC Leukocytes, Urine Positive (A) Negative        Assessment:       1. Acute cystitis with hematuria    2. Dysuria    3. Chronic nasal congestion            Plan:         UA + for leuks although patient did take AZO. Patient has multiple abx allergies as well as drug interactions. Will treat with Fosfomycin. Ucx sent. Red flag signs/sx that warrants ED evaluation discussed with patient/parent who verbalized understanding      Acute cystitis with hematuria  -     CULTURE, URINE  -     fosfomycin (MONUROL) 3 gram Pack; Take 3 g by mouth once. for 1 dose  Dispense: 3 g; Refill: 0    Dysuria  -     POCT Urinalysis, Dipstick, Automated, W/O Scope    Chronic nasal congestion       Patient Instructions   PLEASE READ YOUR DISCHARGE INSTRUCTIONS ENTIRELY AS IT CONTAINS IMPORTANT INFORMATION.      Take the antibiotics to completion.     Drink plenty of fluids, wipe front to back, take showers not baths, no scented soaps, wear breathable cotton underwear, urinate after sexual intercourse.     IF A URINE CULTURE WAS SENT: You will be contacted once it results and appropriate action will be taken if needed.       If you were given a prescription for Pyridium: Take the pyridium three times a day with meals. It will turn your urine orange. If you wear contacts it can turn your contacts orange. You do not need to take the whole prescription you can stop this once the pain is better and finish out the antibiotics.    If you are are female and on BCP use additional methods to prevent pregnancy while on the antibiotics and for one cycle after.   Cranberry juice may help. Get the 100% cranberry juice and mix 4 oz of juice with 4 oz of water and drink this 8 oz glass of liquid once a day.     Please go to the ER for worsening symptoms including fever, worsening flank pain, vomiting, etc.       Please return or see your primary care doctor if you develop new or worsening symptoms.     Please arrange follow up with your primary medical clinic as soon as possible. You must  understand that you've received an Urgent Care treatment only and that you may be released before all of your medical problems are known or treated. You, the patient, will arrange for follow up as instructed. If your symptoms worsen or fail to improve you should go to the Emergency Room.    WE CANNOT RULE OUT ALL POSSIBLE CAUSES OF YOUR SYMPTOMS IN THE URGENT CARE SETTING PLEASE GO TO THE ER IF YOU FEELS YOUR CONDITION IS WORSENING OR YOU WOULD LIKE EMERGENT EVALUATION.

## 2022-09-17 ENCOUNTER — TELEPHONE (OUTPATIENT)
Dept: URGENT CARE | Facility: CLINIC | Age: 77
End: 2022-09-17
Payer: MEDICARE

## 2022-09-17 LAB — BACTERIA UR CULT: ABNORMAL

## 2022-09-20 ENCOUNTER — TELEPHONE (OUTPATIENT)
Dept: URGENT CARE | Facility: CLINIC | Age: 77
End: 2022-09-20
Payer: MEDICARE

## 2022-09-20 DIAGNOSIS — N39.0 URINARY TRACT INFECTION WITHOUT HEMATURIA, SITE UNSPECIFIED: Primary | ICD-10-CM

## 2022-09-20 RX ORDER — CIPROFLOXACIN 500 MG/1
500 TABLET ORAL 2 TIMES DAILY
Qty: 10 TABLET | Refills: 0 | Status: SHIPPED | OUTPATIENT
Start: 2022-09-20 | End: 2022-09-25

## 2022-09-20 NOTE — TELEPHONE ENCOUNTER
Spoke with patient regarding positive urine culture.  Patient having improvement with oral antibiotics as previously prescribed.  Will switch medication to Cipro.  Patient verbalized understanding agrees with plan.  Patient to begin taking 5 day course of Cipro.  Return to clinic if symptoms worsen, change, or persist.  Patient verbalized understanding agrees with plan.

## 2022-09-30 ENCOUNTER — OFFICE VISIT (OUTPATIENT)
Dept: URGENT CARE | Facility: CLINIC | Age: 77
End: 2022-09-30
Payer: MEDICARE

## 2022-09-30 VITALS
RESPIRATION RATE: 14 BRPM | SYSTOLIC BLOOD PRESSURE: 148 MMHG | DIASTOLIC BLOOD PRESSURE: 65 MMHG | HEART RATE: 93 BPM | BODY MASS INDEX: 35.43 KG/M2 | HEIGHT: 63 IN | WEIGHT: 199.94 LBS | TEMPERATURE: 98 F | OXYGEN SATURATION: 97 %

## 2022-09-30 DIAGNOSIS — Z76.0 MEDICATION REFILL: ICD-10-CM

## 2022-09-30 DIAGNOSIS — R21 RASH: Primary | ICD-10-CM

## 2022-09-30 PROCEDURE — 99214 PR OFFICE/OUTPT VISIT, EST, LEVL IV, 30-39 MIN: ICD-10-PCS | Mod: S$GLB,,,

## 2022-09-30 PROCEDURE — 99214 OFFICE O/P EST MOD 30 MIN: CPT | Mod: S$GLB,,,

## 2022-09-30 RX ORDER — PREDNISONE 20 MG/1
10 TABLET ORAL DAILY
Qty: 2 TABLET | Refills: 0 | Status: SHIPPED | OUTPATIENT
Start: 2022-09-30 | End: 2022-10-03

## 2022-09-30 RX ORDER — IPRATROPIUM BROMIDE 42 UG/1
2 SPRAY, METERED NASAL 3 TIMES DAILY
Qty: 15 ML | Refills: 0 | Status: SHIPPED | OUTPATIENT
Start: 2022-09-30 | End: 2022-10-07

## 2022-09-30 NOTE — PATIENT INSTRUCTIONS
Allergic Reaction   If your condition worsens or fails to improve we recommend that you receive another evaluation at the ER immediately or contact your PCP to discuss your concerns or return here. You must understand that you've received an urgent care treatment only and that you may be released before all your medical problems are known or treated. You the patient will arrange for followup care as instructed.   Increase fluids and rest are important  Please take over the counter Pepcid as directed for the next 3-5 days as directed.  Please take Claritin or Zyrtec or Allegra (24 hours) daily for the next 3-5 days.    You can add Benadryl or Hydroxyzine as needed for itching, however these may make you drowsy, so do not  drive or operate heavy equipment or machinery while taking these medications.    If you were given a steroid shot in the clinic and have also been given a prescription for a steroid such as Prednisone or a Medrol Dose Pack, begin today.     If you develop additional symptoms such as tongue swelling or trouble breathing go immediately to the ER.     If you were stung by an insect rarely do these get infected. However if it gets painful, increase redness or drainage you need to be rechecked either here or call your PCP.

## 2022-10-03 ENCOUNTER — TELEPHONE (OUTPATIENT)
Dept: URGENT CARE | Facility: CLINIC | Age: 77
End: 2022-10-03
Payer: MEDICARE

## 2022-10-03 NOTE — TELEPHONE ENCOUNTER
Courtesy call made.  Spoke with Patient(Self) and she states that she is doing better.  The rash is improving.